# Patient Record
Sex: MALE | Race: BLACK OR AFRICAN AMERICAN | NOT HISPANIC OR LATINO
[De-identification: names, ages, dates, MRNs, and addresses within clinical notes are randomized per-mention and may not be internally consistent; named-entity substitution may affect disease eponyms.]

---

## 2022-01-26 ENCOUNTER — APPOINTMENT (OUTPATIENT)
Dept: PULMONOLOGY | Facility: CLINIC | Age: 22
End: 2022-01-26

## 2022-02-28 ENCOUNTER — APPOINTMENT (OUTPATIENT)
Dept: SPEECH THERAPY | Facility: CLINIC | Age: 22
End: 2022-02-28
Payer: MEDICAID

## 2022-02-28 PROCEDURE — 92610 EVALUATE SWALLOWING FUNCTION: CPT | Mod: GN

## 2022-03-01 ENCOUNTER — APPOINTMENT (OUTPATIENT)
Dept: PULMONOLOGY | Facility: CLINIC | Age: 22
End: 2022-03-01
Payer: COMMERCIAL

## 2022-03-01 VITALS
SYSTOLIC BLOOD PRESSURE: 108 MMHG | RESPIRATION RATE: 14 BRPM | HEIGHT: 67 IN | DIASTOLIC BLOOD PRESSURE: 71 MMHG | HEART RATE: 56 BPM | OXYGEN SATURATION: 99 % | TEMPERATURE: 97.3 F | WEIGHT: 123 LBS | BODY MASS INDEX: 19.3 KG/M2

## 2022-03-01 DIAGNOSIS — Z86.59 PERSONAL HISTORY OF OTHER MENTAL AND BEHAVIORAL DISORDERS: ICD-10-CM

## 2022-03-01 DIAGNOSIS — F20.89 OTHER SCHIZOPHRENIA: ICD-10-CM

## 2022-03-01 DIAGNOSIS — Z82.5 FAMILY HISTORY OF ASTHMA AND OTHER CHRONIC LOWER RESPIRATORY DISEASES: ICD-10-CM

## 2022-03-01 PROCEDURE — 99203 OFFICE O/P NEW LOW 30 MIN: CPT | Mod: GC

## 2022-03-01 RX ORDER — RISPERIDONE 0.5 MG/1
TABLET ORAL
Refills: 0 | Status: ACTIVE | COMMUNITY

## 2022-03-01 RX ORDER — CALCIUM CARBONATE 500(1250)
TABLET ORAL
Refills: 0 | Status: ACTIVE | COMMUNITY

## 2022-03-01 RX ORDER — ALBUTEROL SULFATE 90 UG/1
108 (90 BASE) AEROSOL, METERED RESPIRATORY (INHALATION)
Refills: 0 | Status: ACTIVE | COMMUNITY

## 2022-03-01 RX ORDER — CETIRIZINE HCL 10 MG
TABLET ORAL
Refills: 0 | Status: ACTIVE | COMMUNITY

## 2022-03-01 NOTE — REVIEW OF SYSTEMS
[Seasonal Allergies] : seasonal allergies [Negative] : Endocrine [Fever] : no fever [Recent Wt Gain (___ Lbs)] : ~T no recent weight gain [Chills] : no chills [Recent Wt Loss (___ Lbs)] : ~T no recent weight loss [Postnasal Drip] : no postnasal drip [Sinus Problems] : no sinus problems [Cough] : no cough [Hemoptysis] : no hemoptysis [Chest Tightness] : no chest tightness [Frequent URIs] : no frequent URIs [Sputum] : no sputum [Dyspnea] : no dyspnea [Wheezing] : no wheezing [SOB on Exertion] : no sob on exertion [Chest Discomfort] : no chest discomfort

## 2022-03-01 NOTE — PHYSICAL EXAM
[No Acute Distress] : no acute distress [Normal Oropharynx] : normal oropharynx [Normal Appearance] : normal appearance [No Neck Mass] : no neck mass [Normal Rate/Rhythm] : normal rate/rhythm [Normal S1, S2] : normal s1, s2 [No Murmurs] : no murmurs [No Resp Distress] : no resp distress [No Abnormalities] : no abnormalities [Benign] : benign [Normal Gait] : normal gait [No Clubbing] : no clubbing [No Cyanosis] : no cyanosis [No Edema] : no edema [FROM] : FROM [Normal Color/ Pigmentation] : normal color/ pigmentation [No Focal Deficits] : no focal deficits

## 2022-03-01 NOTE — HISTORY OF PRESENT ILLNESS
[TextBox_4] : 23 yo man with past medical history of asthma, ADHD, intermittent explosive disorder, schizophrenia and intellectual disability here for evaluation of asthma. Referred to assess if Symbicort can be discontinued. He was diagnosed with asthma as a child and has been on Symbicort 160 / 4.5 mcg 2 puffs BID. His asthma is well controlled and reports no asthma symptoms for many years. Has not required rescue inhaler in many years as well.

## 2022-03-15 ENCOUNTER — TRANSCRIPTION ENCOUNTER (OUTPATIENT)
Age: 22
End: 2022-03-15

## 2022-10-31 ENCOUNTER — NON-APPOINTMENT (OUTPATIENT)
Age: 22
End: 2022-10-31

## 2024-01-03 ENCOUNTER — APPOINTMENT (OUTPATIENT)
Dept: PULMONOLOGY | Facility: CLINIC | Age: 24
End: 2024-01-03
Payer: MEDICARE

## 2024-01-03 VITALS
DIASTOLIC BLOOD PRESSURE: 70 MMHG | HEART RATE: 60 BPM | SYSTOLIC BLOOD PRESSURE: 108 MMHG | OXYGEN SATURATION: 100 % | WEIGHT: 125 LBS | BODY MASS INDEX: 19.62 KG/M2 | HEIGHT: 67 IN

## 2024-01-03 DIAGNOSIS — J45.20 MILD INTERMITTENT ASTHMA, UNCOMPLICATED: ICD-10-CM

## 2024-01-03 PROCEDURE — ZZZZZ: CPT

## 2024-01-03 PROCEDURE — 94010 BREATHING CAPACITY TEST: CPT

## 2024-01-03 PROCEDURE — 99213 OFFICE O/P EST LOW 20 MIN: CPT | Mod: 25,GC

## 2024-01-03 RX ORDER — ALBUTEROL SULFATE 90 UG/1
108 (90 BASE) INHALANT RESPIRATORY (INHALATION) EVERY 6 HOURS
Qty: 2 | Refills: 5 | Status: ACTIVE | COMMUNITY
Start: 2024-01-03 | End: 1900-01-01

## 2024-01-03 RX ORDER — BUDESONIDE AND FORMOTEROL FUMARATE DIHYDRATE 160; 4.5 UG/1; UG/1
160-4.5 AEROSOL RESPIRATORY (INHALATION)
Refills: 0 | Status: COMPLETED | COMMUNITY
End: 2024-01-03

## 2024-01-03 NOTE — PHYSICAL EXAM
[No Acute Distress] : no acute distress [Normal Appearance] : normal appearance [Normal Rate/Rhythm] : normal rate/rhythm [Normal S1, S2] : normal s1, s2 [No Resp Distress] : no resp distress [Clear to Auscultation Bilaterally] : clear to auscultation bilaterally [No Abnormalities] : no abnormalities [Normal Gait] : normal gait [No Clubbing] : no clubbing [No Edema] : no edema [No Focal Deficits] : no focal deficits [Oriented x3] : oriented x3

## 2024-01-03 NOTE — ASSESSMENT
[FreeTextEntry1] : 25YO Male PMH of asthma, ADHD, intermittent explosive disorder, schizophrenia and intellectual disability who presents for follow up.  #Mild Intermittent Asthma - Asthma symptoms remain well controlled - PFTs are uninterpretable due to difficulty coordinating test - He denies persistent symptoms or nocturnal awakenings - He is able to exercise and carry out his day to day life without limitations - Will continue with PRN Albuterol   RTC as needed  Case discussed with Dr. Chan

## 2024-01-03 NOTE — REVIEW OF SYSTEMS
[Fever] : no fever [Chills] : no chills [Cough] : no cough [Sputum] : no sputum [Dyspnea] : no dyspnea [SOB on Exertion] : no sob on exertion [Chest Discomfort] : no chest discomfort

## 2024-01-03 NOTE — HISTORY OF PRESENT ILLNESS
[Never] : never [TextBox_4] : 23YO Male PMH of asthma, ADHD, intermittent explosive disorder, schizophrenia and intellectual disability who presents for follow up.  He was last seen 3/2022 for initial visit after being referred to see whether he could stop his maintenance Symbicort as his symptoms were controlled and he had not required albuterol rescue doses. Given his control it was decided he should stop Symbicort and continue with Albuterol as needed.   He is present with Nakia from his group home today. He states that he last used his albuterol MDI months ago. He states that he has not woken up short of breath. He is able to exercise and only at the end feels a little SOB. He could no coordinate PFTs this visit so data is uninterpretable.

## 2024-03-01 ENCOUNTER — APPOINTMENT (OUTPATIENT)
Dept: OTOLARYNGOLOGY | Facility: CLINIC | Age: 24
End: 2024-03-01
Payer: MEDICARE

## 2024-03-01 VITALS
SYSTOLIC BLOOD PRESSURE: 106 MMHG | WEIGHT: 125 LBS | BODY MASS INDEX: 19.62 KG/M2 | HEIGHT: 67 IN | DIASTOLIC BLOOD PRESSURE: 67 MMHG

## 2024-03-01 PROCEDURE — 99203 OFFICE O/P NEW LOW 30 MIN: CPT

## 2024-03-05 NOTE — HISTORY OF PRESENT ILLNESS
[de-identified] : 24 year old male presents for initial consultation for clogged ears. Patient states that his ears feel full. Recent URI 1 month ago and fullness since then. vernon drainage.  Denies otorrhea, ear infections, hearing loss, tinnitus, dizziness, vertigo, headaches related to hearing.

## 2025-04-17 ENCOUNTER — NON-APPOINTMENT (OUTPATIENT)
Age: 25
End: 2025-04-17

## 2025-07-17 ENCOUNTER — NON-APPOINTMENT (OUTPATIENT)
Age: 25
End: 2025-07-17

## 2025-07-24 ENCOUNTER — APPOINTMENT (OUTPATIENT)
Dept: PULMONOLOGY | Facility: CLINIC | Age: 25
End: 2025-07-24

## 2025-08-06 ENCOUNTER — APPOINTMENT (OUTPATIENT)
Dept: PULMONOLOGY | Facility: CLINIC | Age: 25
End: 2025-08-06

## 2025-08-21 DIAGNOSIS — J45.20 MILD INTERMITTENT ASTHMA, UNCOMPLICATED: ICD-10-CM

## 2025-09-11 ENCOUNTER — APPOINTMENT (OUTPATIENT)
Dept: OTOLARYNGOLOGY | Facility: CLINIC | Age: 25
End: 2025-09-11